# Patient Record
Sex: FEMALE | Race: OTHER | HISPANIC OR LATINO | Employment: UNEMPLOYED | URBAN - METROPOLITAN AREA
[De-identification: names, ages, dates, MRNs, and addresses within clinical notes are randomized per-mention and may not be internally consistent; named-entity substitution may affect disease eponyms.]

---

## 2019-09-23 ENCOUNTER — HOSPITAL ENCOUNTER (EMERGENCY)
Facility: HOSPITAL | Age: 59
Discharge: HOME | End: 2019-09-23
Attending: EMERGENCY MEDICINE | Admitting: EMERGENCY MEDICINE

## 2019-09-23 VITALS
HEART RATE: 85 BPM | OXYGEN SATURATION: 96 % | TEMPERATURE: 98.4 F | WEIGHT: 191.8 LBS | RESPIRATION RATE: 20 BRPM | DIASTOLIC BLOOD PRESSURE: 84 MMHG | SYSTOLIC BLOOD PRESSURE: 184 MMHG

## 2019-09-23 DIAGNOSIS — R21 RASH AND NONSPECIFIC SKIN ERUPTION: Primary | ICD-10-CM

## 2019-09-23 PROCEDURE — 99283 EMERGENCY DEPT VISIT LOW MDM: CPT | Performed by: PHYSICIAN ASSISTANT

## 2019-09-23 PROCEDURE — 99282 EMERGENCY DEPT VISIT SF MDM: CPT

## 2019-09-23 PROCEDURE — 96372 THER/PROPH/DIAG INJ SC/IM: CPT

## 2019-09-23 RX ORDER — DIPHENHYDRAMINE HYDROCHLORIDE 50 MG/ML
25 INJECTION INTRAMUSCULAR; INTRAVENOUS ONCE
Status: COMPLETED | OUTPATIENT
Start: 2019-09-23 | End: 2019-09-23

## 2019-09-23 RX ORDER — PREDNISONE 10 MG/1
TABLET ORAL
Qty: 20 TABLET | Refills: 0 | Status: SHIPPED | OUTPATIENT
Start: 2019-09-23

## 2019-09-23 RX ADMIN — DIPHENHYDRAMINE HYDROCHLORIDE 25 MG: 50 INJECTION, SOLUTION INTRAMUSCULAR; INTRAVENOUS at 11:48

## 2019-09-23 NOTE — DISCHARGE INSTRUCTIONS
DISCHARGE INSTRUCTIONS:    FOLLOW UP WITH YOUR PRIMARY CARE PROVIDER OR THE [address] [place] [address]  MAKE AN APPOINTMENT TO BE SEEN  TAKE BENADRYL FOR RASH AND ITCHING  IF RASH, SHORTNESS OF BREATH OR TROUBLE SWALLOWING, STOP TAKING THE MEDICATION AND BE SEEN  TAKE PREDNISONE AS PRESCRIBED  IF RASH, SHORTNESS OF BREATH OR TROUBLE SWALLOWING, STOP TAKING THE MEDICATION AND BE SEEN  REST AND DRINK PLENTY OF FLUIDS  IF SYMPTOMS WORSEN OR NEW SYMPTOMS ARISE, RETURN TO THE ER TO BE SEEN

## 2019-09-23 NOTE — ED PROVIDER NOTES
History  Chief Complaint   Patient presents with    Itching     states she has had hives and itching x 3 days  "they are everywhere"  taking benadryl        58y  o female with PMH of HTN presents to the ER for insect bites all over her body for 3 days  Patient states she just recently traveled her and since she arrived, she noticed the rash  She took Benadryl the first day she noticed the rash but hasn't taken any medication for symptoms since  She describes her rash as itchy  Symptoms are constant  She denies new lotion, detergent, soap, shampoo, perfume, environments, animal or plant contact  She denies sick contacts  No one else in the house has this rash  She denies fever, chills, URI symptoms, chest pain, dyspnea, N/V/D, abdominal pain, weakness or paresthesias  History provided by:  Patient   used: Yes (vChatter [id number])        None       Past Medical History:   Diagnosis Date    Hypertension        Past Surgical History:   Procedure Laterality Date    HYSTERECTOMY         No family history on file  I have reviewed and agree with the history as documented  Social History     Tobacco Use    Smoking status: Not on file   Substance Use Topics    Alcohol use: Not on file    Drug use: Not on file        Review of Systems   Constitutional: Negative for chills and fever  HENT: Negative for congestion, drooling, ear discharge, ear pain, facial swelling, rhinorrhea and sore throat  Eyes: Negative for redness  Respiratory: Negative for cough and shortness of breath  Cardiovascular: Negative for chest pain  Gastrointestinal: Negative for abdominal pain, diarrhea, nausea and vomiting  Musculoskeletal: Negative for neck stiffness  Skin: Positive for rash  Allergic/Immunologic: Negative for food allergies  Neurological: Negative for weakness and numbness  Physical Exam  Physical Exam   Constitutional:  Non-toxic appearance  No distress     HENT:   Head: Normocephalic and atraumatic  Right Ear: Tympanic membrane, external ear and ear canal normal  No drainage, swelling or tenderness  No foreign bodies  Tympanic membrane is not erythematous  No hemotympanum  Left Ear: Tympanic membrane, external ear and ear canal normal  No drainage, swelling or tenderness  No foreign bodies  Tympanic membrane is not erythematous  No hemotympanum  Nose: Nose normal    Mouth/Throat: Uvula is midline, oropharynx is clear and moist and mucous membranes are normal  No uvula swelling  No posterior oropharyngeal edema, posterior oropharyngeal erythema or tonsillar abscesses  No tonsillar exudate  Neck: Normal range of motion and phonation normal  Neck supple  No tracheal deviation present  Cardiovascular: Normal rate, regular rhythm, S1 normal, S2 normal and normal heart sounds  Exam reveals no gallop and no friction rub  No murmur heard  Pulmonary/Chest: Effort normal and breath sounds normal  No respiratory distress  She has no decreased breath sounds  She has no wheezes  She has no rhonchi  She has no rales  She exhibits no tenderness  Neurological: She is alert  GCS eye subscore is 4  GCS verbal subscore is 5  GCS motor subscore is 6  Skin: Skin is warm and dry  Rash noted  Insect bites seen mostly on arms and legs  Mild erythema  No edema, vesicles or drainage  Areas are non-tender to palpation  Psychiatric: She has a normal mood and affect  Nursing note and vitals reviewed        Vital Signs  ED Triage Vitals   Temperature Pulse Respirations Blood Pressure SpO2   09/23/19 1031 09/23/19 1031 09/23/19 1031 09/23/19 1031 09/23/19 1031   98 4 °F (36 9 °C) 85 20 (!) 184/84 96 %      Temp src Heart Rate Source Patient Position - Orthostatic VS BP Location FiO2 (%)   -- -- -- -- --             Pain Score       09/23/19 1036       No Pain           Vitals:    09/23/19 1031   BP: (!) 184/84   Pulse: 85         Visual Acuity      ED Medications  Medications   diphenhydrAMINE (BENADRYL) injection 25 mg (25 mg Intramuscular Given 9/23/19 1148)       Diagnostic Studies  Results Reviewed     None                 No orders to display              Procedures  Procedures       ED Course                               MDM  Number of Diagnoses or Management Options  Rash and nonspecific skin eruption: new and does not require workup  Diagnosis management comments: DDX consists of but not limited to: insect bites, urticaria, scabies, tinea    Patient is requesting an IM injection for something for itching  Will give Benadryl  At discharge, I instructed the patient to:  -follow up with pcp  -take Benadryl for itching  -take Prednisone as prescribed  -rest and drink plenty of fluids  -return to the ER if symptoms worsened or new symptoms arose  Patient agreed to this plan and was stable at time of discharge  Patient Progress  Patient progress: stable      Disposition  Final diagnoses:   Rash and nonspecific skin eruption     Time reflects when diagnosis was documented in both MDM as applicable and the Disposition within this note     Time User Action Codes Description Comment    9/23/2019 11:37 AM Erin [place] A Add [R21] Rash and nonspecific skin eruption       ED Disposition     ED Disposition Condition Date/Time Comment    Discharge Stable Mon Sep 23, 2019 11:37 AM Cee Chaves discharge to home/self care              Follow-up Information     Follow up With Specialties Details Why Contact Info Additional [address] [facility] Schedule an appointment as soon as possible for a visit   [address], [address] [facility] [id number]  [address], [address], [address] [place], [place], [address]          Discharge Medication List as of 9/23/2019 11:38 AM      START taking these medications    Details   predniSONE 10 mg tablet Take 4 tablets for 2 days then take 3 tablets for 2 days then take 2 tablets for 2 days then take 1 tablet for 2 days, Print           No discharge procedures on file      ED Provider  Electronically Signed by           Hawa Crabtree PA-C  09/23/19 8023